# Patient Record
Sex: MALE | Race: WHITE | NOT HISPANIC OR LATINO | URBAN - METROPOLITAN AREA
[De-identification: names, ages, dates, MRNs, and addresses within clinical notes are randomized per-mention and may not be internally consistent; named-entity substitution may affect disease eponyms.]

---

## 2017-07-20 ENCOUNTER — IMPORTED ENCOUNTER (OUTPATIENT)
Dept: URBAN - METROPOLITAN AREA CLINIC 31 | Facility: CLINIC | Age: 72
End: 2017-07-20

## 2017-07-20 PROBLEM — H40.1131: Noted: 2017-07-20

## 2017-07-20 PROBLEM — H10.403: Noted: 2017-07-20

## 2017-07-20 PROCEDURE — 99213 OFFICE O/P EST LOW 20 MIN: CPT

## 2017-07-27 ENCOUNTER — IMPORTED ENCOUNTER (OUTPATIENT)
Dept: URBAN - METROPOLITAN AREA CLINIC 31 | Facility: CLINIC | Age: 72
End: 2017-07-27

## 2017-07-27 PROBLEM — H01.001: Noted: 2017-07-27

## 2017-07-27 PROBLEM — H01.005: Noted: 2017-07-27

## 2017-07-27 PROBLEM — H10.403: Noted: 2017-07-27

## 2017-07-27 PROBLEM — H40.1130: Noted: 2017-07-27

## 2017-07-27 PROBLEM — H01.002: Noted: 2017-07-27

## 2017-07-27 PROBLEM — H01.004: Noted: 2017-07-27

## 2017-07-27 PROCEDURE — 99213 OFFICE O/P EST LOW 20 MIN: CPT

## 2018-07-30 ENCOUNTER — IMPORTED ENCOUNTER (OUTPATIENT)
Dept: URBAN - METROPOLITAN AREA CLINIC 31 | Facility: CLINIC | Age: 73
End: 2018-07-30

## 2018-07-30 PROBLEM — H02.054: Noted: 2018-07-30

## 2018-07-30 PROBLEM — H40.1431: Noted: 2018-07-30

## 2018-07-30 PROBLEM — H25.813: Noted: 2018-07-30

## 2018-07-30 PROCEDURE — 67820 REVISE EYELASHES: CPT

## 2018-07-30 PROCEDURE — 99213 OFFICE O/P EST LOW 20 MIN: CPT

## 2018-07-30 PROCEDURE — 92083 EXTENDED VISUAL FIELD XM: CPT

## 2019-01-30 ENCOUNTER — IMPORTED ENCOUNTER (OUTPATIENT)
Dept: URBAN - METROPOLITAN AREA CLINIC 31 | Facility: CLINIC | Age: 74
End: 2019-01-30

## 2019-01-30 PROBLEM — H25.813: Noted: 2019-01-30

## 2019-01-30 PROBLEM — H43.813: Noted: 2019-01-30

## 2019-01-30 PROBLEM — H02.112: Noted: 2019-01-30

## 2019-01-30 PROBLEM — H02.115: Noted: 2019-01-30

## 2019-01-30 PROCEDURE — 92014 COMPRE OPH EXAM EST PT 1/>: CPT

## 2019-01-30 PROCEDURE — 92133 CPTRZD OPH DX IMG PST SGM ON: CPT

## 2019-06-14 ENCOUNTER — IMPORTED ENCOUNTER (OUTPATIENT)
Dept: URBAN - METROPOLITAN AREA CLINIC 31 | Facility: CLINIC | Age: 74
End: 2019-06-14

## 2019-06-14 PROBLEM — H40.1131: Noted: 2019-06-14

## 2019-06-14 PROBLEM — H25.813: Noted: 2019-06-14

## 2019-06-14 PROBLEM — H43.813: Noted: 2019-06-14

## 2019-06-14 PROBLEM — H25.89: Noted: 2019-06-14

## 2019-06-14 PROBLEM — H35.3132: Noted: 2019-06-14

## 2019-06-14 PROBLEM — H40.1431: Noted: 2019-06-14

## 2019-06-14 PROCEDURE — 92014 COMPRE OPH EXAM EST PT 1/>: CPT

## 2019-06-14 PROCEDURE — 92134 CPTRZ OPH DX IMG PST SGM RTA: CPT

## 2019-06-14 PROCEDURE — 92015 DETERMINE REFRACTIVE STATE: CPT

## 2019-06-28 ENCOUNTER — IMPORTED ENCOUNTER (OUTPATIENT)
Dept: URBAN - METROPOLITAN AREA CLINIC 31 | Facility: CLINIC | Age: 74
End: 2019-06-28

## 2019-06-28 PROBLEM — H25.813: Noted: 2019-06-28

## 2019-06-28 PROCEDURE — 76519 ECHO EXAM OF EYE: CPT

## 2019-07-09 ENCOUNTER — IMPORTED ENCOUNTER (OUTPATIENT)
Dept: URBAN - METROPOLITAN AREA CLINIC 31 | Facility: CLINIC | Age: 74
End: 2019-07-09

## 2019-07-09 PROBLEM — Z96.1: Noted: 2019-07-09

## 2019-07-09 PROCEDURE — 99024 POSTOP FOLLOW-UP VISIT: CPT

## 2019-07-16 ENCOUNTER — IMPORTED ENCOUNTER (OUTPATIENT)
Dept: URBAN - METROPOLITAN AREA CLINIC 31 | Facility: CLINIC | Age: 74
End: 2019-07-16

## 2019-07-16 PROBLEM — Z96.1: Noted: 2019-07-16

## 2019-07-16 PROCEDURE — 99024 POSTOP FOLLOW-UP VISIT: CPT

## 2019-07-23 ENCOUNTER — IMPORTED ENCOUNTER (OUTPATIENT)
Dept: URBAN - METROPOLITAN AREA CLINIC 31 | Facility: CLINIC | Age: 74
End: 2019-07-23

## 2019-07-23 PROBLEM — Z96.1: Noted: 2019-07-23

## 2019-07-23 PROCEDURE — 99024 POSTOP FOLLOW-UP VISIT: CPT

## 2019-07-31 ENCOUNTER — IMPORTED ENCOUNTER (OUTPATIENT)
Dept: URBAN - METROPOLITAN AREA CLINIC 31 | Facility: CLINIC | Age: 74
End: 2019-07-31

## 2019-07-31 PROBLEM — Z96.1: Noted: 2019-07-31

## 2019-07-31 PROBLEM — H40.1432: Noted: 2019-07-31

## 2019-07-31 PROCEDURE — 99024 POSTOP FOLLOW-UP VISIT: CPT

## 2019-08-14 ENCOUNTER — IMPORTED ENCOUNTER (OUTPATIENT)
Dept: URBAN - METROPOLITAN AREA CLINIC 31 | Facility: CLINIC | Age: 74
End: 2019-08-14

## 2019-08-14 PROBLEM — Z96.1: Noted: 2019-08-14

## 2019-08-14 PROCEDURE — 99024 POSTOP FOLLOW-UP VISIT: CPT

## 2019-08-14 PROCEDURE — 92015 DETERMINE REFRACTIVE STATE: CPT

## 2019-09-25 ENCOUNTER — IMPORTED ENCOUNTER (OUTPATIENT)
Dept: URBAN - METROPOLITAN AREA CLINIC 31 | Facility: CLINIC | Age: 74
End: 2019-09-25

## 2019-09-25 PROBLEM — Z96.1: Noted: 2019-09-25

## 2019-09-25 PROCEDURE — 99024 POSTOP FOLLOW-UP VISIT: CPT

## 2020-08-31 ENCOUNTER — OFFICE VISIT (OUTPATIENT)
Dept: URBAN - METROPOLITAN AREA CLINIC 9 | Facility: CLINIC | Age: 75
End: 2020-08-31

## 2020-10-12 ENCOUNTER — TELEPHONE ENCOUNTER (OUTPATIENT)
Dept: URBAN - METROPOLITAN AREA CLINIC 9 | Facility: CLINIC | Age: 75
End: 2020-10-12

## 2020-10-22 ENCOUNTER — IMPORTED ENCOUNTER (OUTPATIENT)
Dept: URBAN - METROPOLITAN AREA CLINIC 31 | Facility: CLINIC | Age: 75
End: 2020-10-22

## 2020-10-22 PROBLEM — H25.89: Noted: 2020-10-22

## 2020-10-22 PROBLEM — H04.123: Noted: 2020-10-22

## 2020-10-22 PROBLEM — Z96.1: Noted: 2020-10-22

## 2020-10-22 PROBLEM — H43.813: Noted: 2020-10-22

## 2020-10-22 PROCEDURE — 92014 COMPRE OPH EXAM EST PT 1/>: CPT

## 2020-10-22 PROCEDURE — 92015 DETERMINE REFRACTIVE STATE: CPT

## 2021-04-27 ENCOUNTER — IMPORTED ENCOUNTER (OUTPATIENT)
Dept: URBAN - METROPOLITAN AREA CLINIC 31 | Facility: CLINIC | Age: 76
End: 2021-04-27

## 2021-04-27 PROBLEM — H01.002: Noted: 2021-04-27

## 2021-04-27 PROBLEM — H01.004: Noted: 2021-04-27

## 2021-04-27 PROBLEM — H00.15: Noted: 2021-04-27

## 2021-04-27 PROBLEM — H01.001: Noted: 2021-04-27

## 2021-04-27 PROBLEM — H01.005: Noted: 2021-04-27

## 2021-04-27 PROCEDURE — 99214 OFFICE O/P EST MOD 30 MIN: CPT

## 2021-05-04 ENCOUNTER — IMPORTED ENCOUNTER (OUTPATIENT)
Dept: URBAN - METROPOLITAN AREA CLINIC 31 | Facility: CLINIC | Age: 76
End: 2021-05-04

## 2021-05-04 PROBLEM — H00.15: Noted: 2021-05-04

## 2021-05-04 PROBLEM — H01.002: Noted: 2021-05-04

## 2021-05-04 PROBLEM — H01.004: Noted: 2021-05-04

## 2021-05-04 PROBLEM — H01.001: Noted: 2021-05-04

## 2021-05-04 PROBLEM — H01.005: Noted: 2021-05-04

## 2021-05-04 PROBLEM — H10.503: Noted: 2021-05-04

## 2021-05-04 PROCEDURE — 99214 OFFICE O/P EST MOD 30 MIN: CPT

## 2021-05-20 ENCOUNTER — IMPORTED ENCOUNTER (OUTPATIENT)
Dept: URBAN - METROPOLITAN AREA CLINIC 31 | Facility: CLINIC | Age: 76
End: 2021-05-20

## 2021-05-20 PROBLEM — H01.001: Noted: 2021-05-20

## 2021-05-20 PROBLEM — H10.503: Noted: 2021-05-20

## 2021-05-20 PROBLEM — H01.005: Noted: 2021-05-20

## 2021-05-20 PROBLEM — H01.004: Noted: 2021-05-20

## 2021-05-20 PROBLEM — H00.15: Noted: 2021-05-20

## 2021-05-20 PROBLEM — H01.002: Noted: 2021-05-20

## 2021-05-20 PROCEDURE — 99213 OFFICE O/P EST LOW 20 MIN: CPT

## 2021-10-22 ENCOUNTER — IMPORTED ENCOUNTER (OUTPATIENT)
Dept: URBAN - METROPOLITAN AREA CLINIC 31 | Facility: CLINIC | Age: 76
End: 2021-10-22

## 2021-10-22 PROBLEM — Z96.1: Noted: 2021-10-22

## 2021-10-22 PROBLEM — H10.503: Noted: 2021-10-22

## 2021-10-22 PROBLEM — H00.16: Noted: 2021-10-22

## 2021-10-22 PROBLEM — H01.002: Noted: 2021-10-22

## 2021-10-22 PROBLEM — H01.004: Noted: 2021-10-22

## 2021-10-22 PROBLEM — H01.001: Noted: 2021-10-22

## 2021-10-22 PROBLEM — H01.005: Noted: 2021-10-22

## 2021-10-22 PROCEDURE — 92015 DETERMINE REFRACTIVE STATE: CPT

## 2021-10-22 PROCEDURE — 99214 OFFICE O/P EST MOD 30 MIN: CPT

## 2022-04-01 ASSESSMENT — VISUAL ACUITY
OD_GLARE: 20/60-2MED
OS_SC: J7
OD_CC: 20/30-2
OU_SC: 20/30
OU_CC: 20/25
OD_PH: SC 20/60
OD_CC: 20/40-2
OS_CC: 20/200
OS_SC: J7
OD_CC: 20/25-2
OD_CC: 20/30
OS_CC: 20/25
OS_CC: 20/30+2
OU_CC: 20/30
OS_PH: SC 20/50
OU_SC: 20/30
OD_CC: 20/30-2
OD_CC: 20/80-1
OD_CC: 20/40
OS_CC: 20/80-2
OS_CC: 20/30
OS_CC: 20/50-2
OD_CC: 20/30
OS_PH: SC 20/30 +1
OS_PH: SC 20/40
OD_CC: 20/25-2
OU_SC: 20/40
OU_SC: 20/30
OS_CC: 20/25-2
OD_CC: 20/40-2
OS_CC: 20/40-2
OS_GLARE: 20/400
OD_CC: 20/30-2
OD_CC: 20/25-1
OD_SC: J7
OD_CC: 20/30-2
OS_CC: 20/30
OS_PH: SC 20/30 +2
OS_CC: 20/25+2
OS_PH: SC 20/50 -1
OS_CC: 20/30
OS_CC: 20/30-1
OU_CC: 20/25
OD_CC: 20/25-2
OD_CC: 20/40-2
OD_CC: 20/30-2
OS_CC: 20/40-1
OU_SC: 20/25
OS_CC: 20/30
OD_CC: 20/30
OS_CC: 20/40+1
OS_CC: 20/25-2
OD_SC: J7
OS_CC: 20/30
OS_SC: 20/20-2
OD_SC: 20/25
OD_GLARE: 20/400

## 2022-04-01 ASSESSMENT — TONOMETRY
OS_IOP_MMHG: 17
OD_IOP_MMHG: 16
OS_IOP_MMHG: 19
OS_IOP_MMHG: 16
OD_IOP_MMHG: 15
OS_IOP_MMHG: 16
OD_IOP_MMHG: 15
OD_IOP_MMHG: 55
OS_IOP_MMHG: 18
OD_IOP_MMHG: 22
OS_IOP_MMHG: 14
OD_IOP_MMHG: 21
OD_IOP_MMHG: 17
OD_IOP_MMHG: 15
OS_IOP_MMHG: 28
OS_IOP_MMHG: 15
OD_IOP_MMHG: 15
OS_IOP_MMHG: 14
OD_IOP_MMHG: 16
OD_IOP_MMHG: 16
OS_IOP_MMHG: 16
OS_IOP_MMHG: 16
OS_IOP_MMHG: 18

## 2022-04-22 ENCOUNTER — FOLLOW UP (OUTPATIENT)
Dept: URBAN - METROPOLITAN AREA CLINIC 29 | Facility: CLINIC | Age: 77
End: 2022-04-22

## 2022-04-22 DIAGNOSIS — H40.1430: ICD-10-CM

## 2022-04-22 DIAGNOSIS — H10.503: ICD-10-CM

## 2022-04-22 PROCEDURE — 99213 OFFICE O/P EST LOW 20 MIN: CPT

## 2022-04-22 ASSESSMENT — TONOMETRY
OS_IOP_MMHG: 14
OD_IOP_MMHG: 15

## 2022-04-22 ASSESSMENT — VISUAL ACUITY
OD_SC: 20/30-2
OS_SC: 20/30

## 2022-07-30 ENCOUNTER — TELEPHONE ENCOUNTER (OUTPATIENT)
Age: 77
End: 2022-07-30

## 2022-07-30 RX ORDER — PEPPERMINT OIL 90 MG
1-2 CAPSULE, DELAYED, AND EXTENDED RELEASE ORAL
Qty: 0 | Refills: 2 | OUTPATIENT
Start: 2018-04-17 | End: 2020-08-31

## 2022-07-31 ENCOUNTER — TELEPHONE ENCOUNTER (OUTPATIENT)
Age: 77
End: 2022-07-31

## 2022-07-31 RX ORDER — PEPPERMINT OIL 90 MG
1-2 CAPSULE, DELAYED, AND EXTENDED RELEASE ORAL
Qty: 0 | Refills: 2 | Status: ACTIVE | COMMUNITY
Start: 2018-04-17

## 2023-07-07 ENCOUNTER — OFFICE VISIT (OUTPATIENT)
Dept: URBAN - METROPOLITAN AREA CLINIC 9 | Facility: CLINIC | Age: 78
End: 2023-07-07

## 2023-10-31 ENCOUNTER — TELEPHONE ENCOUNTER (OUTPATIENT)
Dept: URBAN - METROPOLITAN AREA CLINIC 9 | Facility: CLINIC | Age: 78
End: 2023-10-31

## 2023-11-10 ENCOUNTER — OFFICE VISIT (OUTPATIENT)
Dept: URBAN - METROPOLITAN AREA CLINIC 9 | Facility: CLINIC | Age: 78
End: 2023-11-10
Payer: MEDICARE

## 2023-11-10 VITALS
SYSTOLIC BLOOD PRESSURE: 130 MMHG | BODY MASS INDEX: 18.55 KG/M2 | HEIGHT: 73 IN | WEIGHT: 140 LBS | DIASTOLIC BLOOD PRESSURE: 60 MMHG

## 2023-11-10 DIAGNOSIS — R19.4 CHANGE IN BOWEL HABITS: ICD-10-CM

## 2023-11-10 DIAGNOSIS — R79.89 ELEVATED LFTS: ICD-10-CM

## 2023-11-10 DIAGNOSIS — R13.10 DYSPHAGIA, UNSPECIFIED TYPE: ICD-10-CM

## 2023-11-10 PROBLEM — 40739000: Status: ACTIVE | Noted: 2023-11-10

## 2023-11-10 PROCEDURE — 99204 OFFICE O/P NEW MOD 45 MIN: CPT | Performed by: INTERNAL MEDICINE

## 2023-11-10 RX ORDER — PEPPERMINT OIL 90 MG
1-2 CAPSULE, DELAYED, AND EXTENDED RELEASE ORAL
Qty: 0 | Refills: 2 | Status: ON HOLD | COMMUNITY
Start: 2018-04-17

## 2023-11-10 RX ORDER — LATANOPROST 50 UG/ML
1 DROP INTO AFFECTED EYE IN THE EVENING SOLUTION/ DROPS OPHTHALMIC ONCE A DAY
Status: ACTIVE | COMMUNITY

## 2023-11-10 RX ORDER — LOSARTAN POTASSIUM 25 MG/1
1 TABLET TABLET ORAL ONCE A DAY
Status: ACTIVE | COMMUNITY

## 2023-11-10 RX ORDER — FINASTERIDE 5 MG/1
1 TABLET TABLET, FILM COATED ORAL ONCE A DAY
Status: ACTIVE | COMMUNITY

## 2023-11-10 RX ORDER — METHYLPREDNISOLONE 4 MG/1
1 TABLET WITH FOOD OR MILK TITRATED TABLET ORAL
Status: ACTIVE | COMMUNITY

## 2023-11-16 ENCOUNTER — LAB OUTSIDE AN ENCOUNTER (OUTPATIENT)
Dept: URBAN - METROPOLITAN AREA CLINIC 9 | Facility: CLINIC | Age: 78
End: 2023-11-16

## 2023-11-16 ENCOUNTER — OFFICE VISIT (OUTPATIENT)
Dept: URBAN - METROPOLITAN AREA CLINIC 9 | Facility: CLINIC | Age: 78
End: 2023-11-16

## 2023-11-17 ENCOUNTER — OFFICE VISIT (OUTPATIENT)
Dept: URBAN - METROPOLITAN AREA CLINIC 9 | Facility: CLINIC | Age: 78
End: 2023-11-17

## 2023-11-17 LAB
ABSOLUTE BAND NEUTROPHILS: 74
ABSOLUTE BASOPHILS: 0
ABSOLUTE EOSINOPHILS: 74
ABSOLUTE LYMPHOCYTES: 1110
ABSOLUTE METAMYELOCYTES: 148
ABSOLUTE MONOCYTES: 1258
ABSOLUTE MYELOCYTES: 148
ABSOLUTE NEUTROPHILS: 4588
BAND NEUTROPHILS: 1
BASOPHILS: 0
EOSINOPHILS: 1
HBSAG SCREEN: (no result)
HEMATOCRIT: 44.5
HEMOGLOBIN: 15.5
HEP A AB, IGM: (no result)
HEP B CORE AB, IGM: (no result)
HEPATITIS C ANTIBODY: (no result)
LYMPHOCYTES: 15
MCH: 31.7
MCHC: 34.8
MCV: 91
METAMYELOCYTES: 2
MONOCYTES: 17
MPV: 9
MYELOCYTES: 2
NEUTROPHILS: 62
NOTE: (no result)
PLATELET COUNT: 322
RDW: 13.3
RED BLOOD CELL COUNT: 4.89
WHITE BLOOD CELL COUNT: 7.4

## 2023-11-20 ENCOUNTER — CLAIMS CREATED FROM THE CLAIM WINDOW (OUTPATIENT)
Dept: URBAN - METROPOLITAN AREA SURGERY CENTER 9 | Facility: SURGERY CENTER | Age: 78
End: 2023-11-20
Payer: MEDICARE

## 2023-11-20 ENCOUNTER — CLAIMS CREATED FROM THE CLAIM WINDOW (OUTPATIENT)
Dept: URBAN - METROPOLITAN AREA SURGERY CENTER 9 | Facility: SURGERY CENTER | Age: 78
End: 2023-11-20

## 2023-11-20 DIAGNOSIS — K29.70 GASTRITIS WITHOUT BLEEDING, UNSPECIFIED CHRONICITY, UNSPECIFIED GASTRITIS TYPE: ICD-10-CM

## 2023-11-20 DIAGNOSIS — K20.90 ESOPHAGITIS, UNSPECIFIED WITHOUT BLEEDING: ICD-10-CM

## 2023-11-20 PROCEDURE — 43239 EGD BIOPSY SINGLE/MULTIPLE: CPT | Performed by: INTERNAL MEDICINE

## 2023-11-20 PROCEDURE — 43248 EGD GUIDE WIRE INSERTION: CPT | Performed by: INTERNAL MEDICINE

## 2023-11-20 RX ORDER — PEPPERMINT OIL 90 MG
1-2 CAPSULE, DELAYED, AND EXTENDED RELEASE ORAL
Qty: 0 | Refills: 2 | Status: ON HOLD | COMMUNITY
Start: 2018-04-17

## 2023-11-20 RX ORDER — LOSARTAN POTASSIUM 25 MG/1
1 TABLET TABLET ORAL ONCE A DAY
Status: ACTIVE | COMMUNITY

## 2023-11-20 RX ORDER — METHYLPREDNISOLONE 4 MG/1
1 TABLET WITH FOOD OR MILK TITRATED TABLET ORAL
Status: ACTIVE | COMMUNITY

## 2023-11-20 RX ORDER — LATANOPROST 50 UG/ML
1 DROP INTO AFFECTED EYE IN THE EVENING SOLUTION/ DROPS OPHTHALMIC ONCE A DAY
Status: ACTIVE | COMMUNITY

## 2023-11-20 RX ORDER — FINASTERIDE 5 MG/1
1 TABLET TABLET, FILM COATED ORAL ONCE A DAY
Status: ACTIVE | COMMUNITY

## 2023-11-22 ENCOUNTER — TELEPHONE ENCOUNTER (OUTPATIENT)
Dept: URBAN - METROPOLITAN AREA CLINIC 9 | Facility: CLINIC | Age: 78
End: 2023-11-22

## 2023-11-22 LAB
A/G RATIO: 1.5
ACTIN (SMOOTH MUSCLE) ANTIBODY (IGG): <20
ALBUMIN: 4.3
ALKALINE PHOSPHATASE: 103
ALT (SGPT): 137
ANA SCREEN, IFA: NEGATIVE
AST (SGOT): 56
BILIRUBIN, TOTAL: 1.1
BUN/CREATININE RATIO: (no result)
BUN: 17
CALCIUM: 10.2
CARBON DIOXIDE, TOTAL: 29
CHLORIDE: 98
CREATININE: 0.73
EGFR: 93
GLOBULIN, TOTAL: 2.9
GLUCOSE: 103
IMMUNOGLOBULIN A: 115
IMMUNOGLOBULIN G: 1043
IMMUNOGLOBULIN M: 342
INR: 1.1
LKM-1 ANTIBODY (IGG): <=20
MITOCHONDRIAL (M2) ANTIBODY: <=20
POTASSIUM: 4.6
PROTEIN, TOTAL: 7.2
PT: 11.2
RHEUMATOID FACTOR: <14
SJOGREN'S ANTIBODY (SS-A): (no result)
SJOGREN'S ANTIBODY (SS-B): (no result)
SODIUM: 136

## 2023-12-14 ENCOUNTER — OFFICE VISIT (OUTPATIENT)
Dept: URBAN - METROPOLITAN AREA CLINIC 9 | Facility: CLINIC | Age: 78
End: 2023-12-14
Payer: MEDICARE

## 2023-12-14 ENCOUNTER — DASHBOARD ENCOUNTERS (OUTPATIENT)
Age: 78
End: 2023-12-14

## 2023-12-14 VITALS
SYSTOLIC BLOOD PRESSURE: 132 MMHG | BODY MASS INDEX: 19.35 KG/M2 | HEIGHT: 73 IN | WEIGHT: 146 LBS | DIASTOLIC BLOOD PRESSURE: 64 MMHG

## 2023-12-14 DIAGNOSIS — K58.9 IRRITABLE BOWEL SYNDROME WITHOUT DIARRHEA: ICD-10-CM

## 2023-12-14 PROCEDURE — 99212 OFFICE O/P EST SF 10 MIN: CPT | Performed by: INTERNAL MEDICINE

## 2023-12-14 RX ORDER — PEPPERMINT OIL 90 MG
1-2 CAPSULE, DELAYED, AND EXTENDED RELEASE ORAL
Qty: 0 | Refills: 2 | Status: ON HOLD | COMMUNITY
Start: 2018-04-17

## 2023-12-14 RX ORDER — LATANOPROST 50 UG/ML
1 DROP INTO AFFECTED EYE IN THE EVENING SOLUTION/ DROPS OPHTHALMIC ONCE A DAY
Status: ACTIVE | COMMUNITY

## 2023-12-14 RX ORDER — LOSARTAN POTASSIUM 25 MG/1
1 TABLET TABLET ORAL ONCE A DAY
Status: ACTIVE | COMMUNITY

## 2023-12-14 RX ORDER — FINASTERIDE 5 MG/1
1 TABLET TABLET, FILM COATED ORAL ONCE A DAY
Status: ACTIVE | COMMUNITY

## 2023-12-14 RX ORDER — METHYLPREDNISOLONE 4 MG/1
1 TABLET WITH FOOD OR MILK TITRATED TABLET ORAL
Status: ON HOLD | COMMUNITY

## 2023-12-14 NOTE — HPI-TODAY'S VISIT:
Pt here for f/u of abd pain and also elevated lfts . Hx/o metastatic melanoma on keytruda Hx/o ischemic colitis in 2013 with perf s/p resection with Dr. Gustafson . 2018 colon with adenomas 2023 EGD with dilation and bx negative . 11/2023 Lfts elevated iron studies negative Viral hep negative AI evaluation negative . Pt here for f/u. His lfts are still elevated and he is going to undergo repeat MRI and also repeat labs with [place] cancer in 2 weeks. He is hesitant for a colon due to his metastatic disease. he wants to speak with his oncologist and then will call if he is ready to proceed with colonoscopy. His liver w/u is neg and his mild transaminitis may be related to his cancer treatment.

## 2024-06-10 ENCOUNTER — COMPREHENSIVE EXAM (OUTPATIENT)
Dept: URBAN - METROPOLITAN AREA CLINIC 29 | Facility: CLINIC | Age: 79
End: 2024-06-10

## 2024-06-10 DIAGNOSIS — H02.125: ICD-10-CM

## 2024-06-10 DIAGNOSIS — H40.1430: ICD-10-CM

## 2024-06-10 DIAGNOSIS — Z96.1: ICD-10-CM

## 2024-06-10 PROCEDURE — 99214 OFFICE O/P EST MOD 30 MIN: CPT

## 2024-06-10 ASSESSMENT — TONOMETRY
OS_IOP_MMHG: 17
OD_IOP_MMHG: 17

## 2024-06-10 ASSESSMENT — VISUAL ACUITY
OD_SC: 20/20-1
OS_SC: 20/60-2
OD_SC: 20/40-2
OS_SC: 20/30-2

## 2024-12-10 ENCOUNTER — COMPREHENSIVE EXAM (OUTPATIENT)
Age: 79
End: 2024-12-10

## 2024-12-10 DIAGNOSIS — H02.125: ICD-10-CM

## 2024-12-10 DIAGNOSIS — Z96.1: ICD-10-CM

## 2024-12-10 DIAGNOSIS — H40.1430: ICD-10-CM

## 2024-12-10 PROCEDURE — 92133 CPTRZD OPH DX IMG PST SGM ON: CPT

## 2024-12-10 PROCEDURE — 99214 OFFICE O/P EST MOD 30 MIN: CPT

## 2025-01-21 ENCOUNTER — FOLLOW UP (OUTPATIENT)
Age: 80
End: 2025-01-21

## 2025-01-21 DIAGNOSIS — H40.1430: ICD-10-CM

## 2025-01-21 PROCEDURE — 99213 OFFICE O/P EST LOW 20 MIN: CPT

## 2025-02-13 ENCOUNTER — CONSULTATION/EVALUATION (OUTPATIENT)
Age: 80
End: 2025-02-13

## 2025-02-13 DIAGNOSIS — Z98.890: ICD-10-CM

## 2025-02-13 DIAGNOSIS — H40.1431: ICD-10-CM

## 2025-02-13 PROCEDURE — 99214 OFFICE O/P EST MOD 30 MIN: CPT

## 2025-02-13 PROCEDURE — 76514 ECHO EXAM OF EYE THICKNESS: CPT

## 2025-02-13 PROCEDURE — 92020 GONIOSCOPY: CPT

## 2025-03-03 ENCOUNTER — SURGERY/PROCEDURE (OUTPATIENT)
Age: 80
End: 2025-03-03

## 2025-03-03 DIAGNOSIS — H40.1411: ICD-10-CM

## 2025-03-03 PROCEDURE — 65855 TRABECULOPLASTY LASER SURG: CPT

## 2025-03-11 ENCOUNTER — POST-OP (OUTPATIENT)
Age: 80
End: 2025-03-11

## 2025-03-11 DIAGNOSIS — Z98.890: ICD-10-CM

## 2025-03-11 PROCEDURE — 99024 POSTOP FOLLOW-UP VISIT: CPT

## 2025-04-10 ENCOUNTER — FOLLOW UP (OUTPATIENT)
Age: 80
End: 2025-04-10

## 2025-04-10 DIAGNOSIS — H40.1411: ICD-10-CM

## 2025-04-10 PROCEDURE — 99213 OFFICE O/P EST LOW 20 MIN: CPT

## 2025-05-02 ENCOUNTER — FOLLOW UP (OUTPATIENT)
Age: 80
End: 2025-05-02

## 2025-05-02 DIAGNOSIS — H40.1411: ICD-10-CM

## 2025-05-02 PROCEDURE — 99214 OFFICE O/P EST MOD 30 MIN: CPT

## 2025-05-02 RX ORDER — DORZOLAMIDE HYDROCHLORIDE TIMOLOL MALEATE 20; 5 MG/ML; MG/ML: 1 SOLUTION/ DROPS OPHTHALMIC TWICE A DAY
